# Patient Record
Sex: MALE | Race: WHITE | Employment: UNEMPLOYED | URBAN - METROPOLITAN AREA
[De-identification: names, ages, dates, MRNs, and addresses within clinical notes are randomized per-mention and may not be internally consistent; named-entity substitution may affect disease eponyms.]

---

## 2020-07-16 ENCOUNTER — HOSPITAL ENCOUNTER (EMERGENCY)
Age: 50
Discharge: OTHER INSTITUTION | End: 2020-07-17
Attending: EMERGENCY MEDICINE
Payer: OTHER GOVERNMENT

## 2020-07-16 VITALS
OXYGEN SATURATION: 97 % | DIASTOLIC BLOOD PRESSURE: 74 MMHG | TEMPERATURE: 98 F | BODY MASS INDEX: 35.55 KG/M2 | HEIGHT: 69 IN | WEIGHT: 240 LBS | RESPIRATION RATE: 14 BRPM | HEART RATE: 61 BPM | SYSTOLIC BLOOD PRESSURE: 112 MMHG

## 2020-07-16 LAB
ACETAMINOPHEN LEVEL: <5 UG/ML (ref 15–30)
ALBUMIN SERPL-MCNC: 4 GM/DL (ref 3.4–5)
ALCOHOL SCREEN SERUM: <0.01 %WT/VOL
ALP BLD-CCNC: 55 IU/L (ref 40–129)
ALT SERPL-CCNC: 17 U/L (ref 10–40)
AMPHETAMINES: NEGATIVE
ANION GAP SERPL CALCULATED.3IONS-SCNC: 15 MMOL/L (ref 4–16)
AST SERPL-CCNC: 15 IU/L (ref 15–37)
BACTERIA: NEGATIVE /HPF
BARBITURATE SCREEN URINE: NEGATIVE
BASOPHILS ABSOLUTE: 0 K/CU MM
BASOPHILS RELATIVE PERCENT: 0.3 % (ref 0–1)
BENZODIAZEPINE SCREEN, URINE: NEGATIVE
BILIRUB SERPL-MCNC: 1.2 MG/DL (ref 0–1)
BILIRUBIN URINE: NEGATIVE MG/DL
BLOOD, URINE: NEGATIVE
BUN BLDV-MCNC: 8 MG/DL (ref 6–23)
CALCIUM SERPL-MCNC: 9.4 MG/DL (ref 8.3–10.6)
CANNABINOID SCREEN URINE: NEGATIVE
CHLORIDE BLD-SCNC: 102 MMOL/L (ref 99–110)
CLARITY: CLEAR
CO2: 26 MMOL/L (ref 21–32)
COCAINE METABOLITE: NEGATIVE
COLOR: YELLOW
CREAT SERPL-MCNC: 1 MG/DL (ref 0.9–1.3)
DIFFERENTIAL TYPE: ABNORMAL
DOSE AMOUNT: ABNORMAL
DOSE AMOUNT: ABNORMAL
DOSE TIME: ABNORMAL
DOSE TIME: ABNORMAL
EOSINOPHILS ABSOLUTE: 0 K/CU MM
EOSINOPHILS RELATIVE PERCENT: 0.2 % (ref 0–3)
GFR AFRICAN AMERICAN: >60 ML/MIN/1.73M2
GFR NON-AFRICAN AMERICAN: >60 ML/MIN/1.73M2
GLUCOSE BLD-MCNC: 94 MG/DL (ref 70–99)
GLUCOSE, URINE: NEGATIVE MG/DL
HCT VFR BLD CALC: 42.7 % (ref 42–52)
HEMOGLOBIN: 14.6 GM/DL (ref 13.5–18)
IMMATURE NEUTROPHIL %: 0.3 % (ref 0–0.43)
KETONES, URINE: NEGATIVE MG/DL
LEUKOCYTE ESTERASE, URINE: NEGATIVE
LYMPHOCYTES ABSOLUTE: 1.2 K/CU MM
LYMPHOCYTES RELATIVE PERCENT: 14.4 % (ref 24–44)
MCH RBC QN AUTO: 32.4 PG (ref 27–31)
MCHC RBC AUTO-ENTMCNC: 34.2 % (ref 32–36)
MCV RBC AUTO: 94.7 FL (ref 78–100)
MONOCYTES ABSOLUTE: 0.5 K/CU MM
MONOCYTES RELATIVE PERCENT: 6 % (ref 0–4)
MUCUS: ABNORMAL HPF
NITRITE URINE, QUANTITATIVE: NEGATIVE
NUCLEATED RBC %: 0 %
OPIATES, URINE: NEGATIVE
OXYCODONE: NEGATIVE
PDW BLD-RTO: 12.3 % (ref 11.7–14.9)
PH, URINE: 5 (ref 5–8)
PHENCYCLIDINE, URINE: NEGATIVE
PLATELET # BLD: 297 K/CU MM (ref 140–440)
PMV BLD AUTO: 9 FL (ref 7.5–11.1)
POTASSIUM SERPL-SCNC: 3.8 MMOL/L (ref 3.5–5.1)
PROTEIN UA: NEGATIVE MG/DL
RBC # BLD: 4.51 M/CU MM (ref 4.6–6.2)
RBC URINE: 1 /HPF (ref 0–3)
SALICYLATE LEVEL: <0.3 MG/DL (ref 15–30)
SEGMENTED NEUTROPHILS ABSOLUTE COUNT: 6.8 K/CU MM
SEGMENTED NEUTROPHILS RELATIVE PERCENT: 78.8 % (ref 36–66)
SODIUM BLD-SCNC: 143 MMOL/L (ref 135–145)
SPECIFIC GRAVITY UA: 1.02 (ref 1–1.03)
SQUAMOUS EPITHELIAL: <1 /HPF
TOTAL IMMATURE NEUTOROPHIL: 0.03 K/CU MM
TOTAL NUCLEATED RBC: 0 K/CU MM
TOTAL PROTEIN: 6.9 GM/DL (ref 6.4–8.2)
TRICHOMONAS: ABNORMAL /HPF
UROBILINOGEN, URINE: NORMAL MG/DL (ref 0.2–1)
WBC # BLD: 8.6 K/CU MM (ref 4–10.5)
WBC UA: 1 /HPF (ref 0–2)

## 2020-07-16 PROCEDURE — 83721 ASSAY OF BLOOD LIPOPROTEIN: CPT

## 2020-07-16 PROCEDURE — 81001 URINALYSIS AUTO W/SCOPE: CPT

## 2020-07-16 PROCEDURE — G0480 DRUG TEST DEF 1-7 CLASSES: HCPCS

## 2020-07-16 PROCEDURE — U0002 COVID-19 LAB TEST NON-CDC: HCPCS

## 2020-07-16 PROCEDURE — 99285 EMERGENCY DEPT VISIT HI MDM: CPT

## 2020-07-16 PROCEDURE — 80053 COMPREHEN METABOLIC PANEL: CPT

## 2020-07-16 PROCEDURE — 80061 LIPID PANEL: CPT

## 2020-07-16 PROCEDURE — 36415 COLL VENOUS BLD VENIPUNCTURE: CPT

## 2020-07-16 PROCEDURE — 85025 COMPLETE CBC W/AUTO DIFF WBC: CPT

## 2020-07-16 PROCEDURE — 80307 DRUG TEST PRSMV CHEM ANLYZR: CPT

## 2020-07-16 ASSESSMENT — PAIN DESCRIPTION - LOCATION: LOCATION: HEAD

## 2020-07-16 ASSESSMENT — PAIN SCALES - GENERAL: PAINLEVEL_OUTOF10: 5

## 2020-07-16 ASSESSMENT — PATIENT HEALTH QUESTIONNAIRE - PHQ9: SUM OF ALL RESPONSES TO PHQ QUESTIONS 1-9: 8

## 2020-07-16 ASSESSMENT — PAIN DESCRIPTION - FREQUENCY: FREQUENCY: CONTINUOUS

## 2020-07-16 NOTE — ED NOTES
Patient asleep in bed, updated on events. Patient voices no concerns or requests.  remains at bedside.      Yvon Sher RN  07/16/20 1640

## 2020-07-16 NOTE — ED TRIAGE NOTES
Patient presents to ED via EMS from local [facility] where he presented with concern for Suicidal ideations. States these symptoms have been worsening over the last few weeks. Has begun making a plan but has not acted upon these.

## 2020-07-16 NOTE — ED NOTES
Send out COVID test performed and sent to lab. Patient's chart provided for Mental health. Patient lying in bed voices no requests or concerns.  remains at bedside.      Essie Busby RN  07/16/20 6104

## 2020-07-16 NOTE — ED NOTES
Patient's wife would like to be updated [phone], patient is ok with this.      Jade Barr RN  07/16/20 1640

## 2020-07-16 NOTE — ED NOTES
Patient has been evaluated by mental health, awaiting placement acceptance. No rapid COVID tests are available at the hospital currently. Patient remains in bed, voices no requests or concerns at this time.  remains at bedside.      Yvon Sher RN  07/16/20 2187

## 2020-07-16 NOTE — ED NOTES
Nurse to Nurse report given to Chester Mcdonald at [facility]. Awaiting acceptance.      Graeme Groves RN  07/16/20 8496

## 2020-07-16 NOTE — ED NOTES
Belongings collected and inventoried with security. Patient in green safety gown. Patient  is at bedside. All lab work has been collected and sent. Patient lying in bed asleep, no concerns or requests have been voiced at this time.      Zainab Frederick RN  07/16/20 5502

## 2020-07-16 NOTE — ED PROVIDER NOTES
eMERGENCY dEPARTMENT eNCOUnter      PCP: Christy Jeronimo MD    CHIEF COMPLAINT    Chief Complaint   Patient presents with    Suicidal       HPI    Ruslan Nicole is a 52 y.o. male who presents via EMS from [place] for depression and suicidal thoughts. Context is patient states he has been diagnosed with \"anger issues\" depression and anxiety but has not been on any medications for the last 3 to 4 years. States about 6 months ago, he began having increasing episodes of feeling \"worthless\" and that he is sabotaging his life and can't do anything right. Also verbalizing thoughts that \"maybe people would be better off if I was not around. \"  States that he initially tried to manage this by \"staying busy\" but his symptoms have been progressively worsening. He is now having passing thoughts of self-harm including intentional carbon monoxide poisoning by attaching a hose to his exhaust pipe of his vehicle or shooting himself in the head with a shotgun. He does state that he owns a shotgun that belonged to his father in the house but \"it is for sentimental reasons I would never use it to kill myself. \"  He went to the [place] today seeking help with his depression and suicidal thoughts, was pink slipped by [place] provider, and sent by EMS for further evaluation. Patient states that he has been admitted in the [place] in the past for homicidal thoughts. He is denying any homicidal thoughts to others around him but does state \"I have a pet cat that I love that it had for 6 years and is typically very comforting but for the last 3 weeks, I want to kill it because it keeps sitting on the coming to my space. \"  Denies any illicit drug use or alcohol use. Does state that he is been having some marital issues. REVIEW OF SYSTEMS    Psychiatric: See HPI.     General: No fevers or chills  Cardiac:  No chest pain or palpitations  Respiratory: No difficulty breathing or new cough  Neurologic: No Headache or syncope  GI: No vomiting or diarrhea  : No dysuria or hematuria  See HPI for further details. All other systems reviewed and are negative. PAST MEDICAL & SURGICALHISTORY    No past medical history on file. Past Surgical History:   Procedure Laterality Date    KNEE SURGERY         CURRENT MEDICATIONS    Current Outpatient Rx   Medication Sig Dispense Refill    ibuprofen (ADVIL;MOTRIN) 800 MG tablet Take 800 mg by mouth every 6 hours as needed for Pain. ALLERGIES    No Known Allergies    SOCIAL & FAMILYHISTORY    Social History     Socioeconomic History    Marital status: Single     Spouse name: Not on file    Number of children: Not on file    Years of education: Not on file    Highest education level: Not on file   Occupational History    Not on file   Social Needs    Financial resource strain: Not on file    Food insecurity     Worry: Not on file     Inability: Not on file    Transportation needs     Medical: Not on file     Non-medical: Not on file   Tobacco Use    Smoking status: Current Some Day Smoker   Substance and Sexual Activity    Alcohol use: Yes     Comment: social    Drug use: No    Sexual activity: Yes     Partners: Female   Lifestyle    Physical activity     Days per week: Not on file     Minutes per session: Not on file    Stress: Not on file   Relationships    Social connections     Talks on phone: Not on file     Gets together: Not on file     Attends [employer] service: Not on file     Active member of club or organization: Not on file     Attends meetings of clubs or organizations: Not on file     Relationship status: Not on file    Intimate partner violence     Fear of current or ex partner: Not on file     Emotionally abused: Not on file     Physically abused: Not on file     Forced sexual activity: Not on file   Other Topics Concern    Not on file   Social History Narrative    Not on file     No family history on file.     PHYSICAL EXAM    VITAL SIGNS: /71   Pulse 64   Temp 98 °F (36.7 °C) (Oral)   Resp 14   Ht 5' 9\" (1.753 m)   Wt 240 lb (108.9 kg)   SpO2 96%   BMI 35.44 kg/m²   Constitutional:  Well developed, well nourished, no acute distress  Eyes:  EOMI. PERRL, conjunctiva normal   HENT:  Atraumatic, external ears normal, nose normal   Neck/Lymphatics: supple, no JVD, no swollen nodes. No thyromegaly or thyroid nodules. Respiratory:  No respiratory distress, normal breath sounds  Cardiovascular:  Normal rate, normal rhythm, no murmurs  GI:  Soft, nondistended, normal bowel sounds, nontender  Musculoskeletal:  No edema, no acute deformities   Integument:  Well hydrated   Neurologic:  Awake alert and oriented, no slurred speech, normal gross motor coordination and strength   Psychiatric: Cooperative, tearful when discussing his depression and marital issues. Is reporting feeling depressed but no active thoughts of self-harm-\"those thoughts will just randomly past in my head while on watching TV. \"  No homicidal thoughts. No auditory visual hallucinations. Appropriate rate and rhythm of speech. Good eye contact. Not responding to internal stimuli. No flight of ideas.       LABS:  Results for orders placed or performed during the hospital encounter of 07/16/20   CBC Auto Differential   Result Value Ref Range    WBC 8.6 4.0 - 10.5 K/CU MM    RBC 4.51 (L) 4.6 - 6.2 M/CU MM    Hemoglobin 14.6 13.5 - 18.0 GM/DL    Hematocrit 42.7 42 - 52 %    MCV 94.7 78 - 100 FL    MCH 32.4 (H) 27 - 31 PG    MCHC 34.2 32.0 - 36.0 %    RDW 12.3 11.7 - 14.9 %    Platelets 260 341 - 763 K/CU MM    MPV 9.0 7.5 - 11.1 FL    Differential Type AUTOMATED DIFFERENTIAL     Segs Relative 78.8 (H) 36 - 66 %    Lymphocytes % 14.4 (L) 24 - 44 %    Monocytes % 6.0 (H) 0 - 4 %    Eosinophils % 0.2 0 - 3 %    Basophils % 0.3 0 - 1 %    Segs Absolute 6.8 K/CU MM    Lymphocytes Absolute 1.2 K/CU MM    Monocytes Absolute 0.5 K/CU MM    Eosinophils Absolute 0.0 K/CU MM    Basophils Absolute 0.0 K/CU MM    Nucleated RBC % 0.0 %    Total Nucleated RBC 0.0 K/CU MM    Total Immature Neutrophil 0.03 K/CU MM    Immature Neutrophil % 0.3 0 - 0.43 %   Comprehensive Metabolic Panel   Result Value Ref Range    Sodium 143 135 - 145 MMOL/L    Potassium 3.8 3.5 - 5.1 MMOL/L    Chloride 102 99 - 110 mMol/L    CO2 26 21 - 32 MMOL/L    BUN 8 6 - 23 MG/DL    CREATININE 1.0 0.9 - 1.3 MG/DL    Glucose 94 70 - 99 MG/DL    Calcium 9.4 8.3 - 10.6 MG/DL    Alb 4.0 3.4 - 5.0 GM/DL    Total Protein 6.9 6.4 - 8.2 GM/DL    Total Bilirubin 1.2 (H) 0.0 - 1.0 MG/DL    ALT 17 10 - 40 U/L    AST 15 15 - 37 IU/L    Alkaline Phosphatase 55 40 - 129 IU/L    GFR Non-African American >60 >60 mL/min/1.73m2    GFR African American >60 >60 mL/min/1.73m2    Anion Gap 15 4 - 16   Urinalysis   Result Value Ref Range    Color, UA YELLOW YELLOW    Clarity, UA CLEAR CLEAR    Glucose, Urine NEGATIVE NEGATIVE MG/DL    Bilirubin Urine NEGATIVE NEGATIVE MG/DL    Ketones, Urine NEGATIVE NEGATIVE MG/DL    Specific Gravity, UA 1.024 1.001 - 1.035    Blood, Urine NEGATIVE NEGATIVE    pH, Urine 5.0 5.0 - 8.0    Protein, UA NEGATIVE NEGATIVE MG/DL    Urobilinogen, Urine NORMAL 0.2 - 1.0 MG/DL    Nitrite Urine, Quantitative NEGATIVE NEGATIVE    Leukocyte Esterase, Urine NEGATIVE NEGATIVE    RBC, UA 1 0 - 3 /HPF    WBC, UA 1 0 - 2 /HPF    Bacteria, UA NEGATIVE NEGATIVE /HPF    Squam Epithel, UA <1 /HPF    Mucus, UA MODERATE (A) NEGATIVE HPF    Trichomonas, UA NONE SEEN NONE SEEN /HPF   Urine Drug Screen   Result Value Ref Range    Cannabinoid Scrn, Ur NEGATIVE NEGATIVE    Amphetamines NEGATIVE NEGATIVE    Cocaine Metabolite NEGATIVE NEGATIVE    Benzodiazepine Screen, Urine NEGATIVE NEGATIVE    Barbiturate Screen, Ur NEGATIVE NEGATIVE    Opiates, Urine NEGATIVE NEGATIVE    Phencyclidine, Urine NEGATIVE NEGATIVE    Oxycodone NEGATIVE NEGATIVE   Acetaminophen Level   Result Value Ref Range    Acetaminophen Level <5.0 (L) 15 - 30 ug/ml    DOSE AMOUNT DOSE AMT.  GIVEN - UNKNOWN     DOSE TIME DOSE TIME GIVEN - UNKNOWN    Salicylate   Result Value Ref Range    Salicylate Lvl <7.5 (L) 15 - 30 MG/DL    DOSE AMOUNT DOSE AMT. GIVEN - UNKNOWN     DOSE TIME DOSE TIME GIVEN - UNKNOWN    Ethanol   Result Value Ref Range    Alcohol Scrn <0.01 <0.01 %WT/VOL         ED COURSE & MEDICAL DECISION MAKING    Pertinent Labs & Imaging studies reviewed and interpreted. (See chart for details)    Vitals:    07/16/20 1414   BP: 107/71   Pulse: 64   Resp: 14   Temp: 98 °F (36.7 °C)   TempSrc: Oral   SpO2: 96%   Weight: 240 lb (108.9 kg)   Height: 5' 9\" (1.753 m)       There are no medical problems identified which require immediate intervention or which would preclude psychiatric evaluation    Consultation: Mental health Professional consulted in the emergency Department. See [facility] note for details of MH evaluation. Vital signs and nursing notes reviewed during ED course. Patient care and presentation staffed with supervising MD -  Dr Isidra Díaz. All pertinent Lab data and radiographic results reviewed with patient at bedside. The patient and/or the family were informed of the results of any tests/labs/imaging, the treatment plan, and time was allotted to answer questions. Differential diagnoses: Drug or alcohol use or abuse, psychosis, behavioral mental illness, metabolic disturbance, infection, neurologic emergency, other    Clinical  IMPRESSION    1. Depression with suicidal ideation        Patient presents via [place], pink slipped, for suicide ideation and depression worsening over 6 months. On exam, pleasant cooperative 44-year-old male but tearful frequently throughout exam.  Is denying any active thoughts of self-harm but has verbalized a plan in the past.  No homicidal thoughts or auditory/visual hallucinations. Not responding to internal stimuli. Suicide precautions and one-to-one sitter was ordered to bedside. Patient completed ED medical screening evaluation with laboratory workup.  No medical problems identified which require immediate intervention or which would preclude psychiatric evaluation. He is currently awaiting evaluation by mental health crisis provider for disposition plan/placement as needed. Given his presentation and history, I do suspect likely transfer/placement for inpatient psych management. Pending at time of dictation is mental health evaluation. In light of current events, I did utilize appropriate PPE (including N95 face mask, safety glasses, gloves, as recommended by the health facility/national standard best practice, during my bedside interactions with the patient. Diagnosis and plan discussed in detail with patient who understands and agrees. I did discuss this patient's history, ED presentation/course with my attending physician - Dr. Elizabeth Corbin. Final disposition, clinical impression, interpretation of labs/imaging were made by attending physician. Please see his/her note for additional details of their evaluation. Do anticipate placement.       (Please note the MDM and HPI sections of this note were completed with a voice recognition program.  Efforts were made to edit the dictations but occasionally words are mis-transcribed.)        Payal Clifton PA-C  07/16/20 1800

## 2020-07-16 NOTE — ED PROVIDER NOTES
I independently examined and evaluated Araceli Cavazos. In brief, presents with depression and suicidal thoughts. This is been worsening over the last 6 months. He was pink slipped by the [place]. Vitals:    07/16/20 1414   BP: 107/71   Pulse: 64   Resp: 14   Temp: 98 °F (36.7 °C)   SpO2: 96%     Focused exam revealed patient sitting up in the bed. Heart regular rate and rhythm, lungs clear to auscultation bilaterally. Flat affect, suicidal thoughts. ED course:  Basic labs and tox evaluation were obtained and are unremarkable. Patient is medically cleared for mental health crisis evaluation. Mental health crisis evaluated patient and recommended inpatient psychiatric admission. Placement found at [facility]. Accepted by Dr. Elvia Youssef. Will transfer. I did don appropriate PPE (including N95 face mask, protective eye ware/safety glasses, gloves, hair covering, and - isolation gown), as recommended by the health facility/national standard best practice, during my bedside interactions with the patient. All diagnostic, treatment, and disposition decisions were made by myself in conjunction with the advanced practice provider. For all further details of the patient's emergency department visit, please see the advanced practice provider's documentation. Comment: Please note this report has been produced using speech recognition software and may contain errors related to that system including errors in grammar, punctuation, and spelling, as well as words and phrases that may be inappropriate. If there are any questions or concerns please feel free to contact the dictating provider for clarification.         Orly Knapp MD  07/17/20 1336

## 2020-07-16 NOTE — ED NOTES
Updated patient's wife at this time. Patient awaiting to be evaluated by Mental Health at this time. Mental health is in the unit.      Erica Davenport RN  07/16/20 9982

## 2020-07-16 NOTE — ED NOTES
Bed: ED-23  Expected date:   Expected time:   Means of arrival:   Comments:  EMS     Marry Beaver  07/16/20 8245

## 2020-07-17 LAB
CHOLESTEROL: 180 MG/DL
HDLC SERPL-MCNC: 45 MG/DL
LDL CHOLESTEROL DIRECT: 120 MG/DL
TRIGL SERPL-MCNC: 93 MG/DL

## 2020-07-17 NOTE — ED NOTES
Patient in bed asleep, no requests or concerns are voiced. Awaiting transport at this time.      Brittani Vásquez RN  07/16/20 1829

## 2020-07-17 NOTE — ED NOTES
Report received from [facility]. Pt resting.  Sitter 1:1     Tashia Carrillo RN  07/16/20 0511

## 2020-07-17 NOTE — ED NOTES
Rossana Mijares from [address] [place] [address]  after pt transferred to state that they needed pink slip signed directly to them for pt orders. From report provided by Neri Bronson RN pt had phone pink slip completed by the [facility]  /with accepting physician and previous pink slip completed by [facility] to [facility]. Pt signed paperwork willingly and EMTALA for voluntary admission for depression, suicidal thoughts, new anger (desires to hurt the cat that he used to find comfort in). Quentin MCCURDY and Dr. Fransico Klein no longer in department. No other physician who saw patient remains. Other physicians within ED unable to sign pink slip due to not assessing pt. Nabilameenu Cornelsekou at [facility] made aware of this and states he will try to work through it on his end and see if his morning physician will fill one out or change orders to state voluntary admission. Missy Brewer RN ED  made aware of situation.       Pilar Reyes RN  07/17/20 7469

## 2020-07-20 LAB
SARS-COV-2: NOT DETECTED
SOURCE: NORMAL